# Patient Record
Sex: MALE | Race: WHITE | NOT HISPANIC OR LATINO
[De-identification: names, ages, dates, MRNs, and addresses within clinical notes are randomized per-mention and may not be internally consistent; named-entity substitution may affect disease eponyms.]

---

## 2017-03-16 ENCOUNTER — APPOINTMENT (OUTPATIENT)
Dept: CARDIOLOGY | Facility: CLINIC | Age: 67
End: 2017-03-16

## 2017-04-12 ENCOUNTER — APPOINTMENT (OUTPATIENT)
Dept: UROLOGY | Facility: CLINIC | Age: 67
End: 2017-04-12

## 2017-04-13 LAB
APPEARANCE: CLEAR
BACTERIA: NEGATIVE
BILIRUBIN URINE: NEGATIVE
BLOOD URINE: NEGATIVE
COLOR: YELLOW
GLUCOSE QUALITATIVE U: NORMAL MG/DL
KETONES URINE: NEGATIVE
LEUKOCYTE ESTERASE URINE: NEGATIVE
MICROSCOPIC-UA: NORMAL
NITRITE URINE: NEGATIVE
PH URINE: 5.5
PROTEIN URINE: NEGATIVE MG/DL
PSA FREE FLD-MCNC: 18.9 %
PSA FREE SERPL-MCNC: 0.85 NG/ML
PSA SERPL-MCNC: 4.49 NG/ML
RED BLOOD CELLS URINE: 3 /HPF
SPECIFIC GRAVITY URINE: 1.02
SQUAMOUS EPITHELIAL CELLS: 0 /HPF
UROBILINOGEN URINE: NORMAL MG/DL
WHITE BLOOD CELLS URINE: 0 /HPF

## 2017-04-15 LAB — CORE LAB FLUID CYTOLOGY: NORMAL

## 2017-09-11 ENCOUNTER — NON-APPOINTMENT (OUTPATIENT)
Age: 67
End: 2017-09-11

## 2017-09-11 ENCOUNTER — APPOINTMENT (OUTPATIENT)
Dept: CARDIOLOGY | Facility: CLINIC | Age: 67
End: 2017-09-11
Payer: COMMERCIAL

## 2017-09-11 VITALS
WEIGHT: 211 LBS | HEART RATE: 88 BPM | DIASTOLIC BLOOD PRESSURE: 62 MMHG | HEIGHT: 73 IN | BODY MASS INDEX: 27.96 KG/M2 | SYSTOLIC BLOOD PRESSURE: 114 MMHG

## 2017-09-11 DIAGNOSIS — E11.9 TYPE 2 DIABETES MELLITUS W/OUT COMPLICATIONS: ICD-10-CM

## 2017-09-11 PROCEDURE — 99214 OFFICE O/P EST MOD 30 MIN: CPT

## 2017-09-11 PROCEDURE — 93000 ELECTROCARDIOGRAM COMPLETE: CPT

## 2017-10-11 ENCOUNTER — APPOINTMENT (OUTPATIENT)
Dept: UROLOGY | Facility: CLINIC | Age: 67
End: 2017-10-11
Payer: COMMERCIAL

## 2017-10-11 PROCEDURE — 99214 OFFICE O/P EST MOD 30 MIN: CPT

## 2017-10-12 LAB
APPEARANCE: CLEAR
BACTERIA: NEGATIVE
BILIRUBIN URINE: NEGATIVE
BLOOD URINE: NEGATIVE
COLOR: YELLOW
GLUCOSE QUALITATIVE U: NEGATIVE MG/DL
HYALINE CASTS: 0 /LPF
KETONES URINE: NEGATIVE
LEUKOCYTE ESTERASE URINE: NEGATIVE
MICROSCOPIC-UA: NORMAL
NITRITE URINE: NEGATIVE
PH URINE: 5.5
PROTEIN URINE: NEGATIVE MG/DL
PSA FREE FLD-MCNC: 21.1
PSA FREE SERPL-MCNC: 1.01 NG/ML
PSA SERPL-MCNC: 4.79 NG/ML
RED BLOOD CELLS URINE: 4 /HPF
SPECIFIC GRAVITY URINE: 1.02
SQUAMOUS EPITHELIAL CELLS: 0 /HPF
UROBILINOGEN URINE: NEGATIVE MG/DL
WHITE BLOOD CELLS URINE: 0 /HPF

## 2017-10-16 ENCOUNTER — APPOINTMENT (OUTPATIENT)
Dept: CARDIOLOGY | Facility: CLINIC | Age: 67
End: 2017-10-16
Payer: COMMERCIAL

## 2017-10-16 PROCEDURE — 78452 HT MUSCLE IMAGE SPECT MULT: CPT

## 2017-10-16 PROCEDURE — 93015 CV STRESS TEST SUPVJ I&R: CPT

## 2017-10-16 PROCEDURE — A9500: CPT

## 2017-12-02 ENCOUNTER — RX RENEWAL (OUTPATIENT)
Age: 67
End: 2017-12-02

## 2017-12-24 ENCOUNTER — TRANSCRIPTION ENCOUNTER (OUTPATIENT)
Age: 67
End: 2017-12-24

## 2018-03-12 ENCOUNTER — NON-APPOINTMENT (OUTPATIENT)
Age: 68
End: 2018-03-12

## 2018-03-12 ENCOUNTER — APPOINTMENT (OUTPATIENT)
Dept: CARDIOLOGY | Facility: CLINIC | Age: 68
End: 2018-03-12
Payer: COMMERCIAL

## 2018-03-12 VITALS
DIASTOLIC BLOOD PRESSURE: 66 MMHG | HEART RATE: 84 BPM | BODY MASS INDEX: 28.36 KG/M2 | SYSTOLIC BLOOD PRESSURE: 112 MMHG | WEIGHT: 214 LBS | HEIGHT: 73 IN

## 2018-03-12 PROCEDURE — 99214 OFFICE O/P EST MOD 30 MIN: CPT

## 2018-03-12 PROCEDURE — 93025 MICROVOLT T-WAVE ASSESS: CPT

## 2018-04-19 ENCOUNTER — APPOINTMENT (OUTPATIENT)
Dept: UROLOGY | Facility: CLINIC | Age: 68
End: 2018-04-19
Payer: COMMERCIAL

## 2018-04-19 DIAGNOSIS — R97.20 ELEVATED PROSTATE, SPECIFIC ANTIGEN [PSA]: ICD-10-CM

## 2018-04-19 DIAGNOSIS — Z00.00 ENCOUNTER FOR GENERAL ADULT MEDICAL EXAMINATION W/OUT ABNORMAL FINDINGS: ICD-10-CM

## 2018-04-19 PROCEDURE — 99214 OFFICE O/P EST MOD 30 MIN: CPT

## 2018-04-20 LAB
APPEARANCE: CLEAR
BACTERIA: NEGATIVE
BILIRUBIN URINE: NEGATIVE
BLOOD URINE: NEGATIVE
COLOR: YELLOW
CORE LAB FLUID CYTOLOGY: NORMAL
GLUCOSE QUALITATIVE U: NEGATIVE MG/DL
HYALINE CASTS: 1 /LPF
KETONES URINE: NEGATIVE
LEUKOCYTE ESTERASE URINE: NEGATIVE
MICROSCOPIC-UA: NORMAL
NITRITE URINE: NEGATIVE
PH URINE: 5
PROTEIN URINE: NEGATIVE MG/DL
PSA FREE FLD-MCNC: 19
PSA FREE SERPL-MCNC: 0.78 NG/ML
PSA SERPL-MCNC: 4.11 NG/ML
RED BLOOD CELLS URINE: 2 /HPF
SPECIFIC GRAVITY URINE: 1.02
SQUAMOUS EPITHELIAL CELLS: 0 /HPF
UROBILINOGEN URINE: NEGATIVE MG/DL
WHITE BLOOD CELLS URINE: 1 /HPF

## 2018-07-02 ENCOUNTER — RX RENEWAL (OUTPATIENT)
Age: 68
End: 2018-07-02

## 2018-09-13 ENCOUNTER — APPOINTMENT (OUTPATIENT)
Dept: CARDIOLOGY | Facility: CLINIC | Age: 68
End: 2018-09-13
Payer: COMMERCIAL

## 2018-09-13 ENCOUNTER — NON-APPOINTMENT (OUTPATIENT)
Age: 68
End: 2018-09-13

## 2018-09-13 VITALS
HEIGHT: 73 IN | SYSTOLIC BLOOD PRESSURE: 110 MMHG | DIASTOLIC BLOOD PRESSURE: 69 MMHG | WEIGHT: 218 LBS | OXYGEN SATURATION: 97 % | BODY MASS INDEX: 28.89 KG/M2 | HEART RATE: 99 BPM

## 2018-09-13 PROCEDURE — 93000 ELECTROCARDIOGRAM COMPLETE: CPT

## 2018-09-13 PROCEDURE — 99214 OFFICE O/P EST MOD 30 MIN: CPT

## 2018-10-01 ENCOUNTER — RX RENEWAL (OUTPATIENT)
Age: 68
End: 2018-10-01

## 2018-10-24 ENCOUNTER — APPOINTMENT (OUTPATIENT)
Dept: UROLOGY | Facility: CLINIC | Age: 68
End: 2018-10-24
Payer: COMMERCIAL

## 2018-10-24 PROCEDURE — 99214 OFFICE O/P EST MOD 30 MIN: CPT

## 2018-10-25 LAB
APPEARANCE: CLEAR
BACTERIA: NEGATIVE
BILIRUBIN URINE: NEGATIVE
BLOOD URINE: NEGATIVE
COLOR: YELLOW
GLUCOSE QUALITATIVE U: NEGATIVE MG/DL
HYALINE CASTS: 0 /LPF
KETONES URINE: NEGATIVE
LEUKOCYTE ESTERASE URINE: NEGATIVE
MICROSCOPIC-UA: NORMAL
NITRITE URINE: NEGATIVE
PH URINE: 6
PROTEIN URINE: NEGATIVE MG/DL
PSA FREE FLD-MCNC: 23.5
PSA FREE SERPL-MCNC: 1.34 NG/ML
PSA SERPL-MCNC: 5.71 NG/ML
RED BLOOD CELLS URINE: 3 /HPF
SPECIFIC GRAVITY URINE: 1.03
SQUAMOUS EPITHELIAL CELLS: 0 /HPF
UROBILINOGEN URINE: 1 MG/DL
WHITE BLOOD CELLS URINE: 1 /HPF

## 2018-10-26 LAB — CORE LAB FLUID CYTOLOGY: NORMAL

## 2019-01-09 ENCOUNTER — MEDICATION RENEWAL (OUTPATIENT)
Age: 69
End: 2019-01-09

## 2019-02-06 ENCOUNTER — RX RENEWAL (OUTPATIENT)
Age: 69
End: 2019-02-06

## 2019-03-18 ENCOUNTER — APPOINTMENT (OUTPATIENT)
Dept: CARDIOLOGY | Facility: CLINIC | Age: 69
End: 2019-03-18
Payer: COMMERCIAL

## 2019-03-18 PROCEDURE — 93015 CV STRESS TEST SUPVJ I&R: CPT

## 2019-03-18 PROCEDURE — A9500: CPT

## 2019-03-18 PROCEDURE — 78452 HT MUSCLE IMAGE SPECT MULT: CPT

## 2019-04-03 ENCOUNTER — RX RENEWAL (OUTPATIENT)
Age: 69
End: 2019-04-03

## 2019-04-04 ENCOUNTER — APPOINTMENT (OUTPATIENT)
Dept: UROLOGY | Facility: CLINIC | Age: 69
End: 2019-04-04
Payer: COMMERCIAL

## 2019-04-04 PROCEDURE — 99214 OFFICE O/P EST MOD 30 MIN: CPT

## 2019-04-05 LAB
APPEARANCE: CLEAR
BACTERIA: NEGATIVE
BILIRUBIN URINE: NEGATIVE
BLOOD URINE: NEGATIVE
COLOR: YELLOW
GLUCOSE QUALITATIVE U: NORMAL
HYALINE CASTS: 1 /LPF
KETONES URINE: NEGATIVE
LEUKOCYTE ESTERASE URINE: NEGATIVE
MICROSCOPIC-UA: NORMAL
NITRITE URINE: NEGATIVE
PH URINE: 5.5
PROTEIN URINE: NORMAL
PSA FREE FLD-MCNC: 24 %
PSA FREE SERPL-MCNC: 1.19 NG/ML
PSA SERPL-MCNC: 4.93 NG/ML
RED BLOOD CELLS URINE: 1 /HPF
SPECIFIC GRAVITY URINE: 1.03
SQUAMOUS EPITHELIAL CELLS: 0 /HPF
UROBILINOGEN URINE: NORMAL
WHITE BLOOD CELLS URINE: 1 /HPF

## 2019-06-17 ENCOUNTER — MEDICATION RENEWAL (OUTPATIENT)
Age: 69
End: 2019-06-17

## 2019-08-23 ENCOUNTER — EMERGENCY (EMERGENCY)
Facility: HOSPITAL | Age: 69
LOS: 1 days | Discharge: HOME | End: 2019-08-23
Attending: EMERGENCY MEDICINE
Payer: COMMERCIAL

## 2019-08-23 VITALS
WEIGHT: 214.95 LBS | SYSTOLIC BLOOD PRESSURE: 139 MMHG | TEMPERATURE: 99 F | DIASTOLIC BLOOD PRESSURE: 84 MMHG | HEART RATE: 103 BPM | RESPIRATION RATE: 20 BRPM | HEIGHT: 72 IN

## 2019-08-23 VITALS
HEART RATE: 91 BPM | TEMPERATURE: 99 F | SYSTOLIC BLOOD PRESSURE: 120 MMHG | DIASTOLIC BLOOD PRESSURE: 74 MMHG | OXYGEN SATURATION: 95 % | RESPIRATION RATE: 18 BRPM

## 2019-08-23 DIAGNOSIS — Z98.41 CATARACT EXTRACTION STATUS, RIGHT EYE: Chronic | ICD-10-CM

## 2019-08-23 LAB
ALBUMIN SERPL ELPH-MCNC: 4.8 G/DL — SIGNIFICANT CHANGE UP (ref 3.3–5)
ALP SERPL-CCNC: 65 U/L — SIGNIFICANT CHANGE UP (ref 40–120)
ALT FLD-CCNC: 34 U/L — SIGNIFICANT CHANGE UP (ref 10–45)
ANION GAP SERPL CALC-SCNC: 14 MMOL/L — SIGNIFICANT CHANGE UP (ref 5–17)
APPEARANCE UR: CLEAR — SIGNIFICANT CHANGE UP
AST SERPL-CCNC: 28 U/L — SIGNIFICANT CHANGE UP (ref 10–40)
BACTERIA # UR AUTO: NEGATIVE — SIGNIFICANT CHANGE UP
BASOPHILS # BLD AUTO: 0 K/UL — SIGNIFICANT CHANGE UP (ref 0–0.2)
BASOPHILS NFR BLD AUTO: 0 % — SIGNIFICANT CHANGE UP (ref 0–2)
BILIRUB SERPL-MCNC: 0.6 MG/DL — SIGNIFICANT CHANGE UP (ref 0.2–1.2)
BILIRUB UR-MCNC: NEGATIVE — SIGNIFICANT CHANGE UP
BUN SERPL-MCNC: 28 MG/DL — HIGH (ref 7–23)
CALCIUM SERPL-MCNC: 10 MG/DL — SIGNIFICANT CHANGE UP (ref 8.4–10.5)
CHLORIDE SERPL-SCNC: 100 MMOL/L — SIGNIFICANT CHANGE UP (ref 96–108)
CO2 SERPL-SCNC: 20 MMOL/L — LOW (ref 22–31)
COLOR SPEC: SIGNIFICANT CHANGE UP
CREAT SERPL-MCNC: 1.88 MG/DL — HIGH (ref 0.5–1.3)
DIFF PNL FLD: ABNORMAL
EOSINOPHIL # BLD AUTO: 0 K/UL — SIGNIFICANT CHANGE UP (ref 0–0.5)
EOSINOPHIL NFR BLD AUTO: 0.1 % — SIGNIFICANT CHANGE UP (ref 0–6)
EPI CELLS # UR: 0 /HPF — SIGNIFICANT CHANGE UP
GAS PNL BLDV: SIGNIFICANT CHANGE UP
GLUCOSE SERPL-MCNC: 190 MG/DL — HIGH (ref 70–99)
GLUCOSE UR QL: NEGATIVE — SIGNIFICANT CHANGE UP
HCT VFR BLD CALC: 44.4 % — SIGNIFICANT CHANGE UP (ref 39–50)
HGB BLD-MCNC: 14.7 G/DL — SIGNIFICANT CHANGE UP (ref 13–17)
HYALINE CASTS # UR AUTO: 1 /LPF — SIGNIFICANT CHANGE UP (ref 0–2)
KETONES UR-MCNC: ABNORMAL
LEUKOCYTE ESTERASE UR-ACNC: NEGATIVE — SIGNIFICANT CHANGE UP
LYMPHOCYTES # BLD AUTO: 1 K/UL — SIGNIFICANT CHANGE UP (ref 1–3.3)
LYMPHOCYTES # BLD AUTO: 7.6 % — LOW (ref 13–44)
MCHC RBC-ENTMCNC: 29.1 PG — SIGNIFICANT CHANGE UP (ref 27–34)
MCHC RBC-ENTMCNC: 33.1 GM/DL — SIGNIFICANT CHANGE UP (ref 32–36)
MCV RBC AUTO: 87.8 FL — SIGNIFICANT CHANGE UP (ref 80–100)
MONOCYTES # BLD AUTO: 0.5 K/UL — SIGNIFICANT CHANGE UP (ref 0–0.9)
MONOCYTES NFR BLD AUTO: 4.3 % — SIGNIFICANT CHANGE UP (ref 2–14)
NEUTROPHILS # BLD AUTO: 11.1 K/UL — HIGH (ref 1.8–7.4)
NEUTROPHILS NFR BLD AUTO: 88 % — HIGH (ref 43–77)
NITRITE UR-MCNC: NEGATIVE — SIGNIFICANT CHANGE UP
PH UR: 5.5 — SIGNIFICANT CHANGE UP (ref 5–8)
PLATELET # BLD AUTO: 206 K/UL — SIGNIFICANT CHANGE UP (ref 150–400)
POTASSIUM SERPL-MCNC: 4.4 MMOL/L — SIGNIFICANT CHANGE UP (ref 3.5–5.3)
POTASSIUM SERPL-SCNC: 4.4 MMOL/L — SIGNIFICANT CHANGE UP (ref 3.5–5.3)
PROT SERPL-MCNC: 7.8 G/DL — SIGNIFICANT CHANGE UP (ref 6–8.3)
PROT UR-MCNC: ABNORMAL
RBC # BLD: 5.06 M/UL — SIGNIFICANT CHANGE UP (ref 4.2–5.8)
RBC # FLD: 12.3 % — SIGNIFICANT CHANGE UP (ref 10.3–14.5)
RBC CASTS # UR COMP ASSIST: 62 /HPF — HIGH (ref 0–4)
SODIUM SERPL-SCNC: 134 MMOL/L — LOW (ref 135–145)
SP GR SPEC: 1.02 — SIGNIFICANT CHANGE UP (ref 1.01–1.02)
UROBILINOGEN FLD QL: NEGATIVE — SIGNIFICANT CHANGE UP
WBC # BLD: 12.6 K/UL — HIGH (ref 3.8–10.5)
WBC # FLD AUTO: 12.6 K/UL — HIGH (ref 3.8–10.5)
WBC UR QL: 0 /HPF — SIGNIFICANT CHANGE UP (ref 0–5)

## 2019-08-23 PROCEDURE — 85027 COMPLETE CBC AUTOMATED: CPT

## 2019-08-23 PROCEDURE — 87086 URINE CULTURE/COLONY COUNT: CPT

## 2019-08-23 PROCEDURE — 81001 URINALYSIS AUTO W/SCOPE: CPT

## 2019-08-23 PROCEDURE — 84132 ASSAY OF SERUM POTASSIUM: CPT

## 2019-08-23 PROCEDURE — 74176 CT ABD & PELVIS W/O CONTRAST: CPT | Mod: 26

## 2019-08-23 PROCEDURE — 82803 BLOOD GASES ANY COMBINATION: CPT

## 2019-08-23 PROCEDURE — 99284 EMERGENCY DEPT VISIT MOD MDM: CPT | Mod: 25

## 2019-08-23 PROCEDURE — 74176 CT ABD & PELVIS W/O CONTRAST: CPT

## 2019-08-23 PROCEDURE — 84295 ASSAY OF SERUM SODIUM: CPT

## 2019-08-23 PROCEDURE — 82435 ASSAY OF BLOOD CHLORIDE: CPT

## 2019-08-23 PROCEDURE — 85014 HEMATOCRIT: CPT

## 2019-08-23 PROCEDURE — 82330 ASSAY OF CALCIUM: CPT

## 2019-08-23 PROCEDURE — 80053 COMPREHEN METABOLIC PANEL: CPT

## 2019-08-23 PROCEDURE — 83605 ASSAY OF LACTIC ACID: CPT

## 2019-08-23 PROCEDURE — 99284 EMERGENCY DEPT VISIT MOD MDM: CPT

## 2019-08-23 PROCEDURE — 82947 ASSAY GLUCOSE BLOOD QUANT: CPT

## 2019-08-23 RX ORDER — SODIUM CHLORIDE 9 MG/ML
1000 INJECTION INTRAMUSCULAR; INTRAVENOUS; SUBCUTANEOUS ONCE
Refills: 0 | Status: COMPLETED | OUTPATIENT
Start: 2019-08-23 | End: 2019-08-23

## 2019-08-23 RX ADMIN — SODIUM CHLORIDE 1000 MILLILITER(S): 9 INJECTION INTRAMUSCULAR; INTRAVENOUS; SUBCUTANEOUS at 05:14

## 2019-08-23 NOTE — ED ADULT NURSE NOTE - OBJECTIVE STATEMENT
70 y/o male PMH CAD, elevated PSA, HLD, type II diabetes, renal insufficiency arrives to ED with c/o left lower back pain. Patient reports pain started yesterday, again today. Patient stating " I thought I had a kidney stone". Patient described pain as "aching", non radiating. Patient is a/ox4, well appearing. Denies pain upon RN assessment. Patient denies fever/chills, n/v/d. No CVA tenderness noted. Denies urinary symptoms of burning/frequency. Patient denies chest pain, shortness of breath, palpitation. Wife at bedside. No acute distress.

## 2019-08-23 NOTE — ED PROVIDER NOTE - OBJECTIVE STATEMENT
68yo M hx CAD, dm p/w 1 day of llq pain that migrated to L flank colicky in nature now currently resolving. Assc with nausea and 1 episode of vomiting. Denies fever, chills, cp, sob, urinary sx, diarrhea. No hx of kidney stones.

## 2019-08-23 NOTE — ED PROVIDER NOTE - PHYSICAL EXAMINATION
Gen: Well appearing, NAD  Head: NCAT  HEENT: PERRL, MMM, normal conjunctiva, anicteric, neck supple  Lung: CTAB, no adventitious sounds  CV: RRR, no murmurs  Abd: soft, NTND, no rebound or guarding, no CVAT  MSK: No edema, no visible deformities  Neuro: Moving all extremity grossly  Skin: Warm and dry, no evidence of rash  Psych: normal mood and affect

## 2019-08-23 NOTE — ED PROVIDER NOTE - PROGRESS NOTE DETAILS
Adi Moore DO PGY2 - continues to be pain free. Cr is baseline per pt. Will f/u with his urologist this week

## 2019-08-23 NOTE — ED PROVIDER NOTE - ATTENDING CONTRIBUTION TO CARE
pt is a 70 y/o male with l flank pain started earlier today with no position of comfort with n/v earlier, no uarinary complaints, abd soft, nt, renal colic work up ordered.

## 2019-08-23 NOTE — ED ADULT NURSE NOTE - CHPI ED NUR SYMPTOMS NEG
no fever/no vomiting/no abdominal distension/no burning urination/no chills/no hematuria/no nausea/no blood in stool

## 2019-08-23 NOTE — ED PROVIDER NOTE - NSFOLLOWUPINSTRUCTIONS_ED_ALL_ED_FT
- Lab and imaging results, if performed, were discussed with you along with your discharge diagnosis    - Follow up with your doctor in 1 week - bring copies of your results if you were given. If you do not have a primary doctor, please call [phone] to find one convenient for you    - Return to the ED for any new, worsening, or concerning symptoms to you    - Continue all prescribed medications    - Take ibuprofen/tylenol as directed as needed for pain    - Rest and keep yourself hydrated with fluids    - Follow up with your urologist this week

## 2019-08-23 NOTE — ED ADULT NURSE NOTE - PMH
CAD (coronary artery disease)    Elevated PSA    Hyperlipidemia    Renal insufficiency    Type 2 diabetes mellitus

## 2019-08-24 LAB
CULTURE RESULTS: NO GROWTH — SIGNIFICANT CHANGE UP
SPECIMEN SOURCE: SIGNIFICANT CHANGE UP

## 2019-09-05 ENCOUNTER — APPOINTMENT (OUTPATIENT)
Dept: UROLOGY | Facility: CLINIC | Age: 69
End: 2019-09-05
Payer: COMMERCIAL

## 2019-09-05 DIAGNOSIS — R97.20 ELEVATED PROSTATE, SPECIFIC ANTIGEN [PSA]: ICD-10-CM

## 2019-09-05 LAB
APPEARANCE: CLEAR
BACTERIA: NEGATIVE
BILIRUBIN URINE: NEGATIVE
BLOOD URINE: NEGATIVE
COLOR: YELLOW
GLUCOSE QUALITATIVE U: NEGATIVE
HYALINE CASTS: 0 /LPF
KETONES URINE: NEGATIVE
LEUKOCYTE ESTERASE URINE: NEGATIVE
MICROSCOPIC-UA: NORMAL
NITRITE URINE: NEGATIVE
PH URINE: 5.5
PROTEIN URINE: NEGATIVE
RED BLOOD CELLS URINE: 1 /HPF
SPECIFIC GRAVITY URINE: 1.02
SQUAMOUS EPITHELIAL CELLS: 1 /HPF
UROBILINOGEN URINE: NORMAL
WHITE BLOOD CELLS URINE: 1 /HPF

## 2019-09-05 PROCEDURE — 99214 OFFICE O/P EST MOD 30 MIN: CPT

## 2019-09-06 LAB
PSA FREE FLD-MCNC: 21 %
PSA FREE SERPL-MCNC: 1.15 NG/ML
PSA SERPL-MCNC: 5.61 NG/ML

## 2019-09-12 ENCOUNTER — NON-APPOINTMENT (OUTPATIENT)
Age: 69
End: 2019-09-12

## 2019-09-12 ENCOUNTER — APPOINTMENT (OUTPATIENT)
Dept: CARDIOLOGY | Facility: CLINIC | Age: 69
End: 2019-09-12
Payer: COMMERCIAL

## 2019-09-12 VITALS — SYSTOLIC BLOOD PRESSURE: 104 MMHG | DIASTOLIC BLOOD PRESSURE: 60 MMHG

## 2019-09-12 VITALS
WEIGHT: 211 LBS | OXYGEN SATURATION: 95 % | DIASTOLIC BLOOD PRESSURE: 67 MMHG | HEART RATE: 94 BPM | BODY MASS INDEX: 27.96 KG/M2 | SYSTOLIC BLOOD PRESSURE: 99 MMHG | HEIGHT: 73 IN

## 2019-09-12 PROCEDURE — 93000 ELECTROCARDIOGRAM COMPLETE: CPT

## 2019-09-12 PROCEDURE — 99214 OFFICE O/P EST MOD 30 MIN: CPT

## 2019-09-12 NOTE — REASON FOR VISIT
[Coronary Artery Disease] : coronary artery disease [Hyperlipidemia] : hyperlipidemia [FreeTextEntry1] : \par \par \par

## 2019-09-12 NOTE — HISTORY OF PRESENT ILLNESS
[FreeTextEntry1] : He presents in scheduled followup reporting that he continues to feel well. Remains active in the gym daily, including 30 minutes of treadmill exercise to Pulse of 122 bpm without any effort provoked symptoms.No c/o chest, throat,jaw, arm or upper back discomfort.  No dyspnea, orthopnea or PND.  No palpitations, dizziness or syncope.  No edema or claudication.\par Labs with Dr. Tolentino on July 15 demonstrated creatinine 1.60 which was attributed to dehydration and improved upon repeat.  His only other interval problem was the passing of a small kidney stone.

## 2020-02-26 ENCOUNTER — RX RENEWAL (OUTPATIENT)
Age: 70
End: 2020-02-26

## 2020-03-03 ENCOUNTER — APPOINTMENT (OUTPATIENT)
Dept: CARDIOLOGY | Facility: CLINIC | Age: 70
End: 2020-03-03
Payer: COMMERCIAL

## 2020-03-03 PROCEDURE — 93015 CV STRESS TEST SUPVJ I&R: CPT

## 2020-03-03 PROCEDURE — 78452 HT MUSCLE IMAGE SPECT MULT: CPT

## 2020-03-03 PROCEDURE — A9500: CPT

## 2020-03-12 ENCOUNTER — APPOINTMENT (OUTPATIENT)
Dept: UROLOGY | Facility: CLINIC | Age: 70
End: 2020-03-12

## 2020-04-02 ENCOUNTER — APPOINTMENT (OUTPATIENT)
Dept: UROLOGY | Facility: CLINIC | Age: 70
End: 2020-04-02
Payer: COMMERCIAL

## 2020-04-02 DIAGNOSIS — N20.0 CALCULUS OF KIDNEY: ICD-10-CM

## 2020-04-02 PROCEDURE — 99214 OFFICE O/P EST MOD 30 MIN: CPT

## 2020-04-03 LAB
APPEARANCE: CLEAR
BACTERIA: NEGATIVE
BILIRUBIN URINE: NEGATIVE
BLOOD URINE: NEGATIVE
COLOR: YELLOW
GLUCOSE QUALITATIVE U: NORMAL
HYALINE CASTS: 1 /LPF
KETONES URINE: NEGATIVE
LEUKOCYTE ESTERASE URINE: NEGATIVE
MICROSCOPIC-UA: NORMAL
NITRITE URINE: NEGATIVE
PH URINE: 5.5
PROTEIN URINE: NORMAL
PSA FREE FLD-MCNC: 22 %
PSA FREE SERPL-MCNC: 1.04 NG/ML
PSA SERPL-MCNC: 4.66 NG/ML
RED BLOOD CELLS URINE: 1 /HPF
SPECIFIC GRAVITY URINE: 1.03
SQUAMOUS EPITHELIAL CELLS: 0 /HPF
UROBILINOGEN URINE: NORMAL
WHITE BLOOD CELLS URINE: 1 /HPF

## 2020-04-07 ENCOUNTER — TRANSCRIPTION ENCOUNTER (OUTPATIENT)
Age: 70
End: 2020-04-07

## 2020-04-29 ENCOUNTER — APPOINTMENT (OUTPATIENT)
Dept: UROLOGY | Facility: CLINIC | Age: 70
End: 2020-04-29

## 2020-06-29 ENCOUNTER — RX RENEWAL (OUTPATIENT)
Age: 70
End: 2020-06-29

## 2020-09-10 ENCOUNTER — APPOINTMENT (OUTPATIENT)
Dept: CARDIOLOGY | Facility: CLINIC | Age: 70
End: 2020-09-10
Payer: COMMERCIAL

## 2020-09-10 ENCOUNTER — NON-APPOINTMENT (OUTPATIENT)
Age: 70
End: 2020-09-10

## 2020-09-10 VITALS
SYSTOLIC BLOOD PRESSURE: 116 MMHG | BODY MASS INDEX: 27.96 KG/M2 | TEMPERATURE: 97.5 F | OXYGEN SATURATION: 95 % | WEIGHT: 211 LBS | HEIGHT: 73 IN | HEART RATE: 103 BPM | DIASTOLIC BLOOD PRESSURE: 73 MMHG

## 2020-09-10 DIAGNOSIS — E66.9 OBESITY, UNSPECIFIED: ICD-10-CM

## 2020-09-10 PROCEDURE — 99214 OFFICE O/P EST MOD 30 MIN: CPT

## 2020-09-10 PROCEDURE — 93000 ELECTROCARDIOGRAM COMPLETE: CPT

## 2020-09-10 NOTE — REASON FOR VISIT
[Hyperlipidemia] : hyperlipidemia [Coronary Artery Disease] : coronary artery disease [FreeTextEntry1] : \par \par \par

## 2020-09-10 NOTE — HISTORY OF PRESENT ILLNESS
[FreeTextEntry1] : He presents in scheduled followup reporting that he continues to feel well. Remains active including 30 minutes of treadmill exercise without any effort provoked symptoms.No c/o chest, throat,jaw, arm or upper back discomfort.  No dyspnea, orthopnea or PND.  No palpitations, dizziness or syncope.  No edema or claudication.\par

## 2020-09-28 ENCOUNTER — EMERGENCY (EMERGENCY)
Facility: HOSPITAL | Age: 70
LOS: 1 days | Discharge: HOME | End: 2020-09-28
Attending: EMERGENCY MEDICINE
Payer: COMMERCIAL

## 2020-09-28 VITALS
DIASTOLIC BLOOD PRESSURE: 78 MMHG | WEIGHT: 207.9 LBS | SYSTOLIC BLOOD PRESSURE: 131 MMHG | OXYGEN SATURATION: 100 % | TEMPERATURE: 98 F | RESPIRATION RATE: 16 BRPM | HEART RATE: 97 BPM | HEIGHT: 72 IN

## 2020-09-28 VITALS
SYSTOLIC BLOOD PRESSURE: 122 MMHG | DIASTOLIC BLOOD PRESSURE: 67 MMHG | RESPIRATION RATE: 16 BRPM | TEMPERATURE: 98 F | HEART RATE: 91 BPM | OXYGEN SATURATION: 99 %

## 2020-09-28 DIAGNOSIS — Z98.41 CATARACT EXTRACTION STATUS, RIGHT EYE: Chronic | ICD-10-CM

## 2020-09-28 PROCEDURE — 93971 EXTREMITY STUDY: CPT | Mod: 26

## 2020-09-28 PROCEDURE — 99284 EMERGENCY DEPT VISIT MOD MDM: CPT | Mod: 25

## 2020-09-28 PROCEDURE — 93971 EXTREMITY STUDY: CPT

## 2020-09-28 PROCEDURE — 99284 EMERGENCY DEPT VISIT MOD MDM: CPT

## 2020-09-28 RX ORDER — HYDROCORTISONE 1 %
1 OINTMENT (GRAM) TOPICAL ONCE
Refills: 0 | Status: COMPLETED | OUTPATIENT
Start: 2020-09-28 | End: 2020-09-29

## 2020-09-28 NOTE — ED PROVIDER NOTE - CLINICAL SUMMARY MEDICAL DECISION MAKING FREE TEXT BOX
71 y/o M w/ two issues 1) L axillary redness/warmth likely contact dermatitis, low suspicion for cellulitis. Well-demarcated, in same area as deodorant use 2) minimal LLe redness/warmth around shin area. Will obtain DVT study. VSS, no systemic signs of infection. Hydrocortisone cream for L axillary, if us negative likely d/c with pcp f/u.

## 2020-09-28 NOTE — ED PROVIDER NOTE - PHYSICAL EXAMINATION
[Const] well-appearing, resting comfortably, no acute distress  [HEENT] PERRL, EOMI, moist mucus membranes  [Neck] Supple, trachea midline  [CV] +S1/S2, no m/r/g appreciated  [Lungs] Clear to auscultations bilaterally, no adventitious lung sounds  [Abd] soft, non-tender, nondistended in all 4 quadrants  [MSK] 5/5 upper extremity and lower extremity str bilaterally  [Skin] left axillary erythema/warmth, minimal TTP, well-demarcated, minimal redness/swelling to shin  [Neuro] A&Ox3, Cranial Nerves II-XII intact

## 2020-09-28 NOTE — ED PROVIDER NOTE - NSFOLLOWUPINSTRUCTIONS_ED_ALL_ED_FT
You may use warm compresses, ibuprofen and tylenol as needed for the leg pain. The rash under your left arm is likely an allergic reaction, you may use the cream given to you once a day. Follow-up with your primary care doctor within the next 1-3 days.     You were seen in the Emergency Department for leg pain (superficial thrombophlebitis).   1) Advance activity as tolerated.    2) Continue all previously prescribed medications as directed.    3) Follow up with your primary care physician in 24-48 hours - take copies of your results.    4) Return to the Emergency Department for worsening or persistent symptoms, and/or ANY NEW OR CONCERNING SYMPTOMS including but not limited to severely worsening pain, inability to walk, chest pain/shortness of breath.

## 2020-09-28 NOTE — ED ADULT TRIAGE NOTE - CHIEF COMPLAINT QUOTE
Soreness/tenderness/redness to left leg. Redness and tenderness to left arm under armpit approximately 1 hour. Patient concerned for blood clot. Denies SOB. Denies blood thinners.

## 2020-09-28 NOTE — ED PROVIDER NOTE - PROGRESS NOTE DETAILS
Yelena, PGY-2: Pt reassessed multiple times in the past several hours. Patient doing well, no complaints at this time, VSS, pex benign, US reveals superficial thrombophlebitis. Yelena, PGY-2: Patient did not want to wait, left hospital. Verbal instructions given to patient regarding superficial thrombophlebitis over phone, DVT study negative, pt will f/u with PCP, return precautions over phone given.

## 2020-09-28 NOTE — ED ADULT NURSE NOTE - NSIMPLEMENTINTERV_GEN_ALL_ED
Implemented All Universal Safety Interventions:  Juliette to call system. Call bell, personal items and telephone within reach. Instruct patient to call for assistance. Room bathroom lighting operational. Non-slip footwear when patient is off stretcher. Physically safe environment: no spills, clutter or unnecessary equipment. Stretcher in lowest position, wheels locked, appropriate side rails in place.

## 2020-09-28 NOTE — ED PROVIDER NOTE - OBJECTIVE STATEMENT
71 y/o M w/ hx CAD, DM, HLD presents with left axillary rash and left shin redness/warmth. Patient states left axillary rash started today after using his deodorant, mildly painful, bright red/warm. States left shin redness has been going on for the past few days. Denies chest pain, shortness of breath, recent surgery, testosterone/hormones, not-bed bound, no recent travel or sick contacts, illness. No hx of blood clots, denies orthopnea/cough. Denies fevers/chills, nausea/vomiting. 71 y/o M w/ hx CAD, DM, HLD presents with left axillary rash and left shin redness/warmth. Patient states left axillary rash started today after using his deodorant, mildly painful, bright red/warm. States left shin redness has been going on for the past few days. Denies chest pain, shortness of breath, recent surgery, testosterone/hormones, not-bed bound, no recent travel or sick contacts, illness. No hx of blood clots, denies orthopnea/cough. Denies fevers/chills, nausea/vomiting.    Attendinyo male presents with left axillary rash which is itchy and slightly painful.  also with swelling to the left anterior leg.  no shortness of breath.

## 2020-09-29 RX ADMIN — Medication 1 APPLICATION(S): at 00:11

## 2020-10-07 ENCOUNTER — APPOINTMENT (OUTPATIENT)
Dept: UROLOGY | Facility: CLINIC | Age: 70
End: 2020-10-07
Payer: COMMERCIAL

## 2020-10-07 VITALS — TEMPERATURE: 96.7 F

## 2020-10-07 PROCEDURE — 99214 OFFICE O/P EST MOD 30 MIN: CPT

## 2020-10-08 LAB
APPEARANCE: CLEAR
BACTERIA: NEGATIVE
BILIRUBIN URINE: NEGATIVE
BLOOD URINE: NEGATIVE
COLOR: NORMAL
GLUCOSE QUALITATIVE U: NEGATIVE
HYALINE CASTS: 0 /LPF
KETONES URINE: NEGATIVE
LEUKOCYTE ESTERASE URINE: NEGATIVE
MICROSCOPIC-UA: NORMAL
NITRITE URINE: NEGATIVE
PH URINE: 5.5
PROTEIN URINE: NEGATIVE
PSA FREE FLD-MCNC: 20 %
PSA FREE SERPL-MCNC: 1.14 NG/ML
PSA SERPL-MCNC: 5.62 NG/ML
RED BLOOD CELLS URINE: 0 /HPF
SPECIFIC GRAVITY URINE: 1.02
SQUAMOUS EPITHELIAL CELLS: 0 /HPF
UROBILINOGEN URINE: NORMAL
WHITE BLOOD CELLS URINE: 0 /HPF

## 2020-11-24 ENCOUNTER — TRANSCRIPTION ENCOUNTER (OUTPATIENT)
Age: 70
End: 2020-11-24

## 2020-12-18 ENCOUNTER — NON-APPOINTMENT (OUTPATIENT)
Age: 70
End: 2020-12-18

## 2021-01-01 NOTE — ED ADULT NURSE NOTE - OBJECTIVE STATEMENT
Patient is a 70 year old male complaining of L leg and L axilla redness x1 day. Arrived by walk-in from home. Patient is A&O x 4 and appears well. Pt reports he noted redness on his interior left calf yesterday that concerned him for blood clot, and now notes a new redness under L axilla. Denies complaints of (chest pain, sob, fevers, chills, n/v/d, headache, syncope, burning urination, blood in urine, blood in stool, rashes, h/o prior similar episodes. L leg id not swollen, temperature is consistent with the rest of the skin. No open wounds. VSS/ NAD. [Alert] : alert [Acute Distress] : no acute distress [Normocephalic] : normocephalic [Flat Open Anterior Broussard] : flat open anterior fontanelle [Icteric sclera] : nonicteric sclera [PERRL] : PERRL [Red Reflex Bilateral] : red reflex bilateral [Normally Placed Ears] : normally placed ears [Auricles Well Formed] : auricles well formed [Clear Tympanic membranes] : clear tympanic membranes [Light reflex present] : light reflex present [Bony structures visible] : bony structures visible [Patent Auditory Canal] : patent auditory canal [Discharge] : no discharge [Nares Patent] : nares patent [Palate Intact] : palate intact [Uvula Midline] : uvula midline [Supple, full passive range of motion] : supple, full passive range of motion [Palpable Masses] : no palpable masses [Symmetric Chest Rise] : symmetric chest rise [Clear to Auscultation Bilaterally] : clear to auscultation bilaterally [Regular Rate and Rhythm] : regular rate and rhythm [S1, S2 present] : S1, S2 present [Murmurs] : no murmurs [+2 Femoral Pulses] : +2 femoral pulses [Soft] : soft [Tender] : nontender [Distended] : not distended [Bowel Sounds] : bowel sounds present [Umbilical Stump Dry, Clean, Intact] : umbilical stump dry, clean, intact [Hepatomegaly] : no hepatomegaly [Splenomegaly] : no splenomegaly [Normal external genitailia] : normal external genitalia [Central Urethral Opening] : central urethral opening [Patent] : patent [Testicles Descended Bilaterally] : testicles descended bilaterally [Normally Placed] : normally placed [No Abnormal Lymph Nodes Palpated] : no abnormal lymph nodes palpated [Jordan-Ortolani] : negative Jordan-Ortolani [Symmetric Flexed Extremities] : symmetric flexed extremities [Spinal Dimple] : no spinal dimple [Tuft of Hair] : no tuft of hair [Startle Reflex] : startle reflex present [Suck Reflex] : suck reflex present [Rooting] : rooting reflex present [Palmar Grasp] : palmar grasp present [Plantar Grasp] : plantar reflex present [Symmetric Regla] : symmetric Mikado [Jaundice] : not jaundice

## 2021-01-03 ENCOUNTER — TRANSCRIPTION ENCOUNTER (OUTPATIENT)
Age: 71
End: 2021-01-03

## 2021-02-14 ENCOUNTER — RX RENEWAL (OUTPATIENT)
Age: 71
End: 2021-02-14

## 2021-03-09 ENCOUNTER — APPOINTMENT (OUTPATIENT)
Dept: CARDIOLOGY | Facility: CLINIC | Age: 71
End: 2021-03-09
Payer: COMMERCIAL

## 2021-03-09 PROCEDURE — A9500: CPT

## 2021-03-09 PROCEDURE — 99072 ADDL SUPL MATRL&STAF TM PHE: CPT

## 2021-03-09 PROCEDURE — 78452 HT MUSCLE IMAGE SPECT MULT: CPT

## 2021-03-09 PROCEDURE — 93015 CV STRESS TEST SUPVJ I&R: CPT

## 2021-03-30 ENCOUNTER — RX RENEWAL (OUTPATIENT)
Age: 71
End: 2021-03-30

## 2021-04-07 ENCOUNTER — APPOINTMENT (OUTPATIENT)
Dept: UROLOGY | Facility: CLINIC | Age: 71
End: 2021-04-07
Payer: COMMERCIAL

## 2021-04-07 PROCEDURE — 99214 OFFICE O/P EST MOD 30 MIN: CPT

## 2021-04-07 PROCEDURE — 99072 ADDL SUPL MATRL&STAF TM PHE: CPT

## 2021-04-08 LAB
APPEARANCE: CLEAR
BACTERIA: NEGATIVE
BILIRUBIN URINE: NEGATIVE
BLOOD URINE: NEGATIVE
COLOR: YELLOW
GLUCOSE QUALITATIVE U: NEGATIVE
HYALINE CASTS: 0 /LPF
KETONES URINE: NEGATIVE
LEUKOCYTE ESTERASE URINE: NEGATIVE
MICROSCOPIC-UA: NORMAL
NITRITE URINE: NEGATIVE
PH URINE: 5.5
PROTEIN URINE: NORMAL
PSA FREE FLD-MCNC: 23 %
PSA FREE SERPL-MCNC: 1.36 NG/ML
PSA SERPL-MCNC: 5.94 NG/ML
RED BLOOD CELLS URINE: 6 /HPF
SPECIFIC GRAVITY URINE: 1.03
SQUAMOUS EPITHELIAL CELLS: 0 /HPF
UROBILINOGEN URINE: NORMAL
WHITE BLOOD CELLS URINE: 1 /HPF

## 2021-06-15 ENCOUNTER — APPOINTMENT (OUTPATIENT)
Dept: CARDIOLOGY | Facility: CLINIC | Age: 71
End: 2021-06-15
Payer: COMMERCIAL

## 2021-06-15 ENCOUNTER — NON-APPOINTMENT (OUTPATIENT)
Age: 71
End: 2021-06-15

## 2021-06-15 VITALS
SYSTOLIC BLOOD PRESSURE: 115 MMHG | WEIGHT: 207 LBS | BODY MASS INDEX: 27.43 KG/M2 | OXYGEN SATURATION: 97 % | DIASTOLIC BLOOD PRESSURE: 76 MMHG | HEIGHT: 73 IN | HEART RATE: 89 BPM

## 2021-06-15 PROCEDURE — 99072 ADDL SUPL MATRL&STAF TM PHE: CPT

## 2021-06-15 PROCEDURE — 93000 ELECTROCARDIOGRAM COMPLETE: CPT

## 2021-06-15 PROCEDURE — 99214 OFFICE O/P EST MOD 30 MIN: CPT

## 2021-06-15 NOTE — HISTORY OF PRESENT ILLNESS
[FreeTextEntry1] : He presents in scheduled followup reporting that he continues to feel well. Remains active, including 30 minutes of treadmill exercise without any effort provoked symptoms.No c/o chest, throat,jaw, arm or upper back discomfort.  No dyspnea, orthopnea or PND.  No palpitations, dizziness or syncope.  No edema or claudication.\par \par \par Time somnolence and some snoring.  He does awaken 2-3 times nightly for nocturia (Dr. Goins aware).  At his wife's goading, plan is to present for a sleep study.\par \par  with diabetic diet.  Last A1c 7.3%.

## 2021-06-15 NOTE — REASON FOR VISIT
[Coronary Artery Disease] : coronary artery disease [Hyperlipidemia] : hyperlipidemia [FreeTextEntry1] : sinus tachycardia

## 2021-06-15 NOTE — CARDIOLOGY SUMMARY
[___] : [unfilled] [de-identified] : 3/9/2021: 10 minutes of the Rex protocol even repeat heart rate 158 beats minute with a normal blood pressure response.  No ST segment abnormalities.  Rare PVCs.  EF > 70%.  Mixed fixed defects in basal to mid inferior walls is corrected with prone imaging consistent with attenuation artifact.

## 2021-11-04 ENCOUNTER — APPOINTMENT (OUTPATIENT)
Dept: UROLOGY | Facility: CLINIC | Age: 71
End: 2021-11-04
Payer: COMMERCIAL

## 2021-11-04 PROCEDURE — 99214 OFFICE O/P EST MOD 30 MIN: CPT

## 2021-11-05 LAB
APPEARANCE: CLEAR
BACTERIA: NEGATIVE
BILIRUBIN URINE: NEGATIVE
BLOOD URINE: NEGATIVE
COLOR: NORMAL
GLUCOSE QUALITATIVE U: NEGATIVE
HYALINE CASTS: 0 /LPF
KETONES URINE: NEGATIVE
LEUKOCYTE ESTERASE URINE: NEGATIVE
MICROSCOPIC-UA: NORMAL
NITRITE URINE: NEGATIVE
PH URINE: 6.5
PROTEIN URINE: NEGATIVE
PSA FREE FLD-MCNC: 17 %
PSA FREE SERPL-MCNC: 1.18 NG/ML
PSA SERPL-MCNC: 6.77 NG/ML
RED BLOOD CELLS URINE: 1 /HPF
SPECIFIC GRAVITY URINE: 1.02
SQUAMOUS EPITHELIAL CELLS: 0 /HPF
UROBILINOGEN URINE: NORMAL
WHITE BLOOD CELLS URINE: 1 /HPF

## 2021-11-28 ENCOUNTER — RX RENEWAL (OUTPATIENT)
Age: 71
End: 2021-11-28

## 2021-12-14 ENCOUNTER — APPOINTMENT (OUTPATIENT)
Dept: CARDIOLOGY | Facility: CLINIC | Age: 71
End: 2021-12-14
Payer: COMMERCIAL

## 2021-12-14 ENCOUNTER — NON-APPOINTMENT (OUTPATIENT)
Age: 71
End: 2021-12-14

## 2021-12-14 VITALS
DIASTOLIC BLOOD PRESSURE: 68 MMHG | WEIGHT: 198 LBS | OXYGEN SATURATION: 97 % | HEART RATE: 90 BPM | SYSTOLIC BLOOD PRESSURE: 110 MMHG | HEIGHT: 73 IN | BODY MASS INDEX: 26.24 KG/M2

## 2021-12-14 PROCEDURE — 93000 ELECTROCARDIOGRAM COMPLETE: CPT

## 2021-12-14 PROCEDURE — 99214 OFFICE O/P EST MOD 30 MIN: CPT

## 2021-12-14 NOTE — HISTORY OF PRESENT ILLNESS
[FreeTextEntry1] : He presents in scheduled followup reporting that he continues to feel well. Remains active, including 30 minutes of treadmill exercise without any effort provoked symptoms.No c/o chest, throat,jaw, arm or upper back discomfort.  No dyspnea, orthopnea or PND.  No palpitations, dizziness or syncope.  No edema or claudication.\par \par \par Despite weight loss, unchanged daytime somnolence and some snoring.  He does awaken 1-2 times nightly for nocturia. \par \par Compliant with diabetic diet.  Last A1c 7.0%.

## 2021-12-14 NOTE — CARDIOLOGY SUMMARY
[___] : [unfilled] [de-identified] : 3/9/2021: 10 minutes of the Rex protocol even repeat heart rate 158 beats minute with a normal blood pressure response.  No ST segment abnormalities.  Rare PVCs.  EF > 70%.  Mixed fixed defects in basal to mid inferior walls is corrected with prone imaging consistent with attenuation artifact.

## 2022-01-29 ENCOUNTER — RX RENEWAL (OUTPATIENT)
Age: 72
End: 2022-01-29

## 2022-03-28 ENCOUNTER — RX RENEWAL (OUTPATIENT)
Age: 72
End: 2022-03-28

## 2022-04-15 NOTE — ED ADULT NURSE NOTE - CAS TRG GENERAL NORM CIRC DET
Diagnosis:   1. Malignant neoplasm of head of pancreas (CMS/HCC)       Regimen: gemzar/abraxane  Cycle/Day: cycle 2 day 1  Is this a C1D1 appt?  No       is supervising clinician today.    Vital Signs:   Vitals:    04/15/22 1354   BP: 136/71   Pulse: (!) 105   Resp: 16   Temp: 98 °F (36.7 °C)   Weight: 58.7 kg (129 lb 4.8 oz)   Height: 5' 4\" (1.626 m)   PainSc: 6    PainLoc: Generalized        Allergies:    ALLERGIES:   Allergen Reactions   • Ace Inhibitors PRURITUS     unsure   • Ampicillin RASH     BLISTERS   • Avocado   (Food Or Med) Other (See Comments)     STOMACH CRAMPS   • Azithromycin Other (See Comments)     Facial swelling   • Metformin Other (See Comments)     Can't recall  VOMINTING   • Penicillins RASH     Blisters   • Sulfa Antibiotics RASH     BLISTERS   • Adhesive   (Environmental) RASH and ERYTHEMA     NYLON TAPE        Medications:  The medication list was reviewed. No changes noted.     ECOG:   ECOG [04/15/22 1400]   ECOG Performance Status 1       Distress Screening: Is this day one of cycle or a new regimen? No Distress screening reviewed from previous visit, no further interventions    Toxicity Assessment:      Prechemo Checklist  Chemo Consent Signed: Yes  Protocol Verified: Yes  Is Protocol Standard of Care or Research?: Standard of Care  Pre-Chemo Labs Reviewed?: Yes  Provider Notified of Abnormal Labs Not Meeting Treatment Conditions: N/A  Pregnancy Screening Performed (if indicated): Not applicable  All Treatment Conditions Met?: Yes  BSA/weight in Orders Verified for Weight-Based Drugs?: Yes  Chemo Dose Calculations Verified: Yes     Pre-Treatment: Patient has valid pre-authorization  Premed orders, including hydration, are verified prior to administration  Chemotherapy doses independently doubled checked & verified by two practitioners  I have reviewed the following with the patient:  Name of chemo drug, duration and route of infusion, and reportable infusion-related symptoms.      Treatment: Refer to [facility] and MAR for line assessment and medication administration  Chemotherapy has not ; double checked & verified by two practitioners  Appearance and physical integrity of drugs meets standard of drug monograph; double checked & verified by two practitioners  Rate set on infusion pump is in alignment with ordered rate; double checked & verified by two practitioners  Blood return confirmed before, during and after treatment administered  Infusion pump used for non-vesicant drugs    Post Treatment: No Adverse Reaction Noted, Patient tolerated well.      Oral Chemotherapy: No    Education: No new instructions needed    Next appointment scheduled: 1 week chemo   Patient instructed to call the office with any questions or concerns.    Patient Discharged: patient discharged to home per self, ambulatory, with family member   Capillary refill less/equal to 2 seconds/Strong peripheral pulses

## 2022-05-03 ENCOUNTER — TRANSCRIPTION ENCOUNTER (OUTPATIENT)
Age: 72
End: 2022-05-03

## 2022-05-04 ENCOUNTER — APPOINTMENT (OUTPATIENT)
Dept: UROLOGY | Facility: CLINIC | Age: 72
End: 2022-05-04

## 2022-06-14 ENCOUNTER — NON-APPOINTMENT (OUTPATIENT)
Age: 72
End: 2022-06-14

## 2022-06-14 ENCOUNTER — APPOINTMENT (OUTPATIENT)
Dept: CARDIOLOGY | Facility: CLINIC | Age: 72
End: 2022-06-14
Payer: COMMERCIAL

## 2022-06-14 VITALS — OXYGEN SATURATION: 98 % | BODY MASS INDEX: 26.39 KG/M2 | HEART RATE: 91 BPM | WEIGHT: 200 LBS

## 2022-06-14 VITALS — DIASTOLIC BLOOD PRESSURE: 68 MMHG | SYSTOLIC BLOOD PRESSURE: 115 MMHG

## 2022-06-14 DIAGNOSIS — Z87.898 PERSONAL HISTORY OF OTHER SPECIFIED CONDITIONS: ICD-10-CM

## 2022-06-14 DIAGNOSIS — M79.602 PAIN IN RIGHT ARM: ICD-10-CM

## 2022-06-14 DIAGNOSIS — M79.601 PAIN IN RIGHT ARM: ICD-10-CM

## 2022-06-14 PROCEDURE — 93000 ELECTROCARDIOGRAM COMPLETE: CPT

## 2022-06-14 PROCEDURE — 99215 OFFICE O/P EST HI 40 MIN: CPT

## 2022-06-14 NOTE — CARDIOLOGY SUMMARY
[___] : [unfilled] [de-identified] : 3/9/2021: 10 minutes of the Rex protocol even repeat heart rate 158 beats minute with a normal blood pressure response.  No ST segment abnormalities.  Rare PVCs.  EF > 70%.  Mixed fixed defects in basal to mid inferior walls is corrected with prone imaging consistent with attenuation artifact.

## 2022-06-14 NOTE — HISTORY OF PRESENT ILLNESS
[FreeTextEntry1] : He presents in scheduled followup reporting that he continues to feel generally well. Remains active, including 30 minutes of treadmill exercise without any effort provoked symptoms.\par \par However 3 days ago, while driving home from the city the afternoon he experienced a sudden onset of bilateral shoulder pain radiating down both arms with associated arm weakness.  No palpitations, dyspnea or diaphoresis.  No focal neurologic symptoms.  He was able to continue driving and the episode resolved spontaneously within "a couple of minutes".  Felt well thereafter and his usual treadmill routine over the past 3 days has been well-tolerated.\par \par No c/o chest, throat,jaw, or upper back discomfort.  No dyspnea, orthopnea or PND.  No palpitations, dizziness or syncope.  No edema or claudication.\par \par Compliant with diabetic diet.  Morning blood sugars are almost always around 110-120.  Blood pressure is typically 110/70.

## 2022-06-21 ENCOUNTER — OUTPATIENT (OUTPATIENT)
Dept: OUTPATIENT SERVICES | Facility: HOSPITAL | Age: 72
LOS: 1 days | End: 2022-06-21
Payer: COMMERCIAL

## 2022-06-21 ENCOUNTER — APPOINTMENT (OUTPATIENT)
Dept: CT IMAGING | Facility: CLINIC | Age: 72
End: 2022-06-21
Payer: COMMERCIAL

## 2022-06-21 DIAGNOSIS — Z00.8 ENCOUNTER FOR OTHER GENERAL EXAMINATION: ICD-10-CM

## 2022-06-21 DIAGNOSIS — Z98.41 CATARACT EXTRACTION STATUS, RIGHT EYE: Chronic | ICD-10-CM

## 2022-06-21 DIAGNOSIS — Z87.898 PERSONAL HISTORY OF OTHER SPECIFIED CONDITIONS: ICD-10-CM

## 2022-06-21 PROCEDURE — 71275 CT ANGIOGRAPHY CHEST: CPT | Mod: 26

## 2022-06-21 PROCEDURE — 71275 CT ANGIOGRAPHY CHEST: CPT

## 2022-06-22 ENCOUNTER — NON-APPOINTMENT (OUTPATIENT)
Age: 72
End: 2022-06-22

## 2022-06-23 ENCOUNTER — APPOINTMENT (OUTPATIENT)
Dept: CARDIOLOGY | Facility: CLINIC | Age: 72
End: 2022-06-23

## 2022-06-23 PROCEDURE — 93306 TTE W/DOPPLER COMPLETE: CPT

## 2022-06-27 ENCOUNTER — APPOINTMENT (OUTPATIENT)
Dept: CARDIOLOGY | Facility: CLINIC | Age: 72
End: 2022-06-27
Payer: COMMERCIAL

## 2022-06-27 PROCEDURE — 93351 STRESS TTE COMPLETE: CPT

## 2022-07-25 ENCOUNTER — APPOINTMENT (OUTPATIENT)
Dept: CARDIOLOGY | Facility: CLINIC | Age: 72
End: 2022-07-25

## 2022-09-11 ENCOUNTER — NON-APPOINTMENT (OUTPATIENT)
Age: 72
End: 2022-09-11

## 2022-09-13 ENCOUNTER — APPOINTMENT (OUTPATIENT)
Dept: UROLOGY | Facility: CLINIC | Age: 72
End: 2022-09-13

## 2022-09-13 DIAGNOSIS — R35.0 FREQUENCY OF MICTURITION: ICD-10-CM

## 2022-09-13 PROCEDURE — 99214 OFFICE O/P EST MOD 30 MIN: CPT

## 2022-09-14 LAB
APPEARANCE: CLEAR
BACTERIA: NEGATIVE
BILIRUBIN URINE: NEGATIVE
BLOOD URINE: NEGATIVE
COLOR: YELLOW
GLUCOSE QUALITATIVE U: NORMAL
HYALINE CASTS: 1 /LPF
KETONES URINE: NEGATIVE
LEUKOCYTE ESTERASE URINE: NEGATIVE
MICROSCOPIC-UA: NORMAL
NITRITE URINE: NEGATIVE
PH URINE: 5.5
PROTEIN URINE: NEGATIVE
PSA FREE FLD-MCNC: 20 %
PSA FREE SERPL-MCNC: 1.78 NG/ML
PSA SERPL-MCNC: 8.82 NG/ML
RED BLOOD CELLS URINE: 1 /HPF
SPECIFIC GRAVITY URINE: 1.03
SQUAMOUS EPITHELIAL CELLS: 0 /HPF
UROBILINOGEN URINE: NORMAL
WHITE BLOOD CELLS URINE: 1 /HPF

## 2022-09-28 ENCOUNTER — NON-APPOINTMENT (OUTPATIENT)
Age: 72
End: 2022-09-28

## 2022-09-28 ENCOUNTER — APPOINTMENT (OUTPATIENT)
Dept: CARDIOLOGY | Facility: CLINIC | Age: 72
End: 2022-09-28

## 2022-09-28 VITALS
BODY MASS INDEX: 25.98 KG/M2 | DIASTOLIC BLOOD PRESSURE: 72 MMHG | SYSTOLIC BLOOD PRESSURE: 106 MMHG | HEART RATE: 98 BPM | OXYGEN SATURATION: 96 % | RESPIRATION RATE: 17 BRPM | WEIGHT: 196 LBS | HEIGHT: 73 IN

## 2022-09-28 PROCEDURE — 93000 ELECTROCARDIOGRAM COMPLETE: CPT

## 2022-09-28 PROCEDURE — 99214 OFFICE O/P EST MOD 30 MIN: CPT | Mod: 25

## 2022-09-28 RX ORDER — TIRZEPATIDE 5 MG/.5ML
5 INJECTION, SOLUTION SUBCUTANEOUS
Refills: 0 | Status: ACTIVE | COMMUNITY
Start: 2022-09-28

## 2022-09-28 RX ORDER — DULAGLUTIDE 1.5 MG/.5ML
1.5 INJECTION, SOLUTION SUBCUTANEOUS
Refills: 0 | Status: DISCONTINUED | COMMUNITY
Start: 2021-06-15 | End: 2022-09-28

## 2022-09-28 NOTE — HISTORY OF PRESENT ILLNESS
[FreeTextEntry1] : He presents in scheduled followup reporting that he continues to feel generally well. Remains active, including 30 minutes of treadmill exercise without any effort provoked symptoms.\par \par No recurrence of shoulder pain with arm weakness.  No palpitations, dyspnea or diaphoresis.  \par \par No c/o chest, throat,jaw, or upper back discomfort.  No dyspnea, orthopnea or PND.  No palpitations, dizziness or syncope.  No edema or claudication.\par \par Compliant with diabetic diet.

## 2022-09-28 NOTE — CARDIOLOGY SUMMARY
[de-identified] : 3/9/2021: 10 minutes of the Rex protocol even repeat heart rate 158 beats minute with a normal blood pressure response.  No ST segment abnormalities.  Rare PVCs.  EF > 70%.  Mixed fixed defects in basal to mid inferior walls is corrected with prone imaging consistent with attenuation artifact. [de-identified] : 6/23/2022 mild mitral vegetation.  Calcified trileaflet aortic valve without stenosis or regurgitation.  Ascending aorta = 3.7 cm.  Normal LV systolic function with 80 mm DUST".  Stage I diastolic dysfunction.  Estimated PA pressure = 22 mmHg. [de-identified] : 6/22/2022 CT: Normal aortic dimensions.  No intramural hematoma or dissection. [___] : [unfilled]

## 2023-01-06 ENCOUNTER — RX RENEWAL (OUTPATIENT)
Age: 73
End: 2023-01-06

## 2023-03-14 ENCOUNTER — APPOINTMENT (OUTPATIENT)
Dept: UROLOGY | Facility: CLINIC | Age: 73
End: 2023-03-14
Payer: COMMERCIAL

## 2023-03-14 VITALS
DIASTOLIC BLOOD PRESSURE: 77 MMHG | HEART RATE: 98 BPM | RESPIRATION RATE: 16 BRPM | OXYGEN SATURATION: 99 % | SYSTOLIC BLOOD PRESSURE: 129 MMHG | TEMPERATURE: 97.6 F

## 2023-03-14 DIAGNOSIS — R97.20 ELEVATED PROSTATE, SPECIFIC ANTIGEN [PSA]: ICD-10-CM

## 2023-03-14 PROCEDURE — 99214 OFFICE O/P EST MOD 30 MIN: CPT

## 2023-03-15 ENCOUNTER — TRANSCRIPTION ENCOUNTER (OUTPATIENT)
Age: 73
End: 2023-03-15

## 2023-03-15 LAB
APPEARANCE: CLEAR
BACTERIA: NEGATIVE
BILIRUBIN URINE: NEGATIVE
BLOOD URINE: NEGATIVE
COLOR: YELLOW
GLUCOSE QUALITATIVE U: ABNORMAL
HYALINE CASTS: 0 /LPF
KETONES URINE: NEGATIVE
LEUKOCYTE ESTERASE URINE: NEGATIVE
MICROSCOPIC-UA: NORMAL
NITRITE URINE: NEGATIVE
PH URINE: 5.5
PROTEIN URINE: NORMAL
PSA FREE FLD-MCNC: 24 %
PSA FREE SERPL-MCNC: 1.95 NG/ML
PSA SERPL-MCNC: 8.29 NG/ML
RED BLOOD CELLS URINE: 2 /HPF
SPECIFIC GRAVITY URINE: 1.03
SQUAMOUS EPITHELIAL CELLS: 0 /HPF
UROBILINOGEN URINE: NORMAL
WHITE BLOOD CELLS URINE: 2 /HPF

## 2023-03-18 ENCOUNTER — RX RENEWAL (OUTPATIENT)
Age: 73
End: 2023-03-18

## 2023-03-27 ENCOUNTER — NON-APPOINTMENT (OUTPATIENT)
Age: 73
End: 2023-03-27

## 2023-03-27 ENCOUNTER — APPOINTMENT (OUTPATIENT)
Dept: CARDIOLOGY | Facility: CLINIC | Age: 73
End: 2023-03-27
Payer: COMMERCIAL

## 2023-03-27 VITALS
DIASTOLIC BLOOD PRESSURE: 71 MMHG | HEART RATE: 91 BPM | BODY MASS INDEX: 25.18 KG/M2 | SYSTOLIC BLOOD PRESSURE: 107 MMHG | WEIGHT: 190 LBS | HEIGHT: 73 IN | OXYGEN SATURATION: 96 %

## 2023-03-27 PROCEDURE — 99214 OFFICE O/P EST MOD 30 MIN: CPT | Mod: 25

## 2023-03-27 PROCEDURE — 93000 ELECTROCARDIOGRAM COMPLETE: CPT

## 2023-03-27 NOTE — HISTORY OF PRESENT ILLNESS
[FreeTextEntry1] : He presents in scheduled followup reporting that he continues to feel very well. Remains active, including 30 minutes of treadmill exercise without any effort provoked symptoms.\par \par No recurrence of shoulder pain with arm weakness.  No palpitations, dyspnea or diaphoresis.  \par \par No c/o chest, throat,jaw, or upper back discomfort.  No dyspnea, orthopnea or PND.  No palpitations, dizziness or syncope.  No edema or claudication.\par \par Compliant with diabetic diet.\par \par Bruises easily.

## 2023-03-27 NOTE — CARDIOLOGY SUMMARY
[___] : [unfilled] [de-identified] : 3/9/2021: 10 minutes of the Rex protocol even repeat heart rate 158 beats minute with a normal blood pressure response.  No ST segment abnormalities.  Rare PVCs.  EF > 70%.  Mixed fixed defects in basal to mid inferior walls is corrected with prone imaging consistent with attenuation artifact. [de-identified] : 6/23/2022 mild mitral vegetation.  Calcified trileaflet aortic valve without stenosis or regurgitation.  Ascending aorta = 3.7 cm.  Normal LV systolic function with 80 mm DUST".  Stage I diastolic dysfunction.  Estimated PA pressure = 22 mmHg. [de-identified] : 6/22/2022 CT: Normal aortic dimensions.  No intramural hematoma or dissection.

## 2023-08-15 ENCOUNTER — RX RENEWAL (OUTPATIENT)
Age: 73
End: 2023-08-15

## 2023-08-17 ENCOUNTER — RX RENEWAL (OUTPATIENT)
Age: 73
End: 2023-08-17

## 2023-08-17 RX ORDER — EZETIMIBE 10 MG/1
10 TABLET ORAL
Qty: 90 | Refills: 3 | Status: ACTIVE | COMMUNITY
Start: 2018-03-12 | End: 1900-01-01

## 2023-09-21 ENCOUNTER — APPOINTMENT (OUTPATIENT)
Dept: UROLOGY | Facility: CLINIC | Age: 73
End: 2023-09-21
Payer: COMMERCIAL

## 2023-09-21 DIAGNOSIS — R97.20 ELEVATED PROSTATE, SPECIFIC ANTIGEN [PSA]: ICD-10-CM

## 2023-09-21 DIAGNOSIS — N52.9 MALE ERECTILE DYSFUNCTION, UNSPECIFIED: ICD-10-CM

## 2023-09-21 PROCEDURE — 99214 OFFICE O/P EST MOD 30 MIN: CPT

## 2023-09-21 RX ORDER — TADALAFIL 5 MG/1
5 TABLET ORAL
Qty: 90 | Refills: 3 | Status: ACTIVE | COMMUNITY
Start: 2023-09-21 | End: 1900-01-01

## 2023-09-22 LAB
APPEARANCE: CLEAR
BACTERIA: NEGATIVE /HPF
BILIRUBIN URINE: NEGATIVE
BLOOD URINE: NEGATIVE
CAST: 0 /LPF
COLOR: NORMAL
EPITHELIAL CELLS: 1 /HPF
GLUCOSE QUALITATIVE U: NEGATIVE MG/DL
KETONES URINE: ABNORMAL MG/DL
LEUKOCYTE ESTERASE URINE: NEGATIVE
MICROSCOPIC-UA: NORMAL
NITRITE URINE: NEGATIVE
PH URINE: 5.5
PROTEIN URINE: NEGATIVE MG/DL
PSA FREE FLD-MCNC: 20 %
PSA FREE SERPL-MCNC: 1.94 NG/ML
PSA SERPL-MCNC: 9.53 NG/ML
RED BLOOD CELLS URINE: 1 /HPF
SPECIFIC GRAVITY URINE: 1.03
UROBILINOGEN URINE: 0.2 MG/DL
WHITE BLOOD CELLS URINE: 1 /HPF

## 2023-09-22 RX ORDER — FINASTERIDE 5 MG/1
5 TABLET, FILM COATED ORAL
Qty: 90 | Refills: 3 | Status: ACTIVE | COMMUNITY
Start: 2023-09-22 | End: 1900-01-01

## 2023-09-26 ENCOUNTER — NON-APPOINTMENT (OUTPATIENT)
Age: 73
End: 2023-09-26

## 2023-09-26 ENCOUNTER — APPOINTMENT (OUTPATIENT)
Dept: CARDIOLOGY | Facility: CLINIC | Age: 73
End: 2023-09-26
Payer: COMMERCIAL

## 2023-09-26 VITALS
BODY MASS INDEX: 23.22 KG/M2 | HEART RATE: 98 BPM | WEIGHT: 176 LBS | SYSTOLIC BLOOD PRESSURE: 107 MMHG | OXYGEN SATURATION: 99 % | DIASTOLIC BLOOD PRESSURE: 70 MMHG

## 2023-09-26 PROCEDURE — 93000 ELECTROCARDIOGRAM COMPLETE: CPT

## 2023-09-26 PROCEDURE — 99214 OFFICE O/P EST MOD 30 MIN: CPT | Mod: 25

## 2023-09-26 RX ORDER — FINASTERIDE 5 MG/1
5 TABLET, FILM COATED ORAL
Refills: 0 | Status: ACTIVE | COMMUNITY
Start: 2023-09-26

## 2023-10-18 ENCOUNTER — RESULT REVIEW (OUTPATIENT)
Age: 73
End: 2023-10-18

## 2023-10-18 ENCOUNTER — OUTPATIENT (OUTPATIENT)
Dept: OUTPATIENT SERVICES | Facility: HOSPITAL | Age: 73
LOS: 1 days | End: 2023-10-18
Payer: COMMERCIAL

## 2023-10-18 ENCOUNTER — APPOINTMENT (OUTPATIENT)
Dept: MRI IMAGING | Facility: CLINIC | Age: 73
End: 2023-10-18
Payer: COMMERCIAL

## 2023-10-18 DIAGNOSIS — R97.20 ELEVATED PROSTATE SPECIFIC ANTIGEN [PSA]: ICD-10-CM

## 2023-10-18 DIAGNOSIS — Z00.8 ENCOUNTER FOR OTHER GENERAL EXAMINATION: ICD-10-CM

## 2023-10-18 DIAGNOSIS — Z98.41 CATARACT EXTRACTION STATUS, RIGHT EYE: Chronic | ICD-10-CM

## 2023-10-18 PROCEDURE — 76498 UNLISTED MR PROCEDURE: CPT

## 2023-10-18 PROCEDURE — 76498P: CUSTOM | Mod: 26

## 2023-10-18 PROCEDURE — 72197 MRI PELVIS W/O & W/DYE: CPT

## 2023-10-18 PROCEDURE — 72197 MRI PELVIS W/O & W/DYE: CPT | Mod: 26

## 2023-10-18 PROCEDURE — A9585: CPT

## 2024-03-20 ENCOUNTER — NON-APPOINTMENT (OUTPATIENT)
Age: 74
End: 2024-03-20

## 2024-03-21 ENCOUNTER — APPOINTMENT (OUTPATIENT)
Dept: UROLOGY | Facility: CLINIC | Age: 74
End: 2024-03-21
Payer: COMMERCIAL

## 2024-03-21 DIAGNOSIS — N40.1 BENIGN PROSTATIC HYPERPLASIA WITH LOWER URINARY TRACT SYMPMS: ICD-10-CM

## 2024-03-21 DIAGNOSIS — N13.8 BENIGN PROSTATIC HYPERPLASIA WITH LOWER URINARY TRACT SYMPMS: ICD-10-CM

## 2024-03-21 PROCEDURE — 99214 OFFICE O/P EST MOD 30 MIN: CPT

## 2024-03-21 PROCEDURE — G2211 COMPLEX E/M VISIT ADD ON: CPT

## 2024-03-21 NOTE — PHYSICAL EXAM
[Normal Appearance] : normal appearance [Well Groomed] : well groomed [General Appearance - In No Acute Distress] : no acute distress [Edema] : no peripheral edema [Exaggerated Use Of Accessory Muscles For Inspiration] : no accessory muscle use [Respiration, Rhythm And Depth] : normal respiratory rhythm and effort [Abdomen Soft] : soft [Abdomen Tenderness] : non-tender [Costovertebral Angle Tenderness] : no ~M costovertebral angle tenderness [Urinary Bladder Findings] : the bladder was normal on palpation [] : no rash [Normal Station and Gait] : the gait and station were normal for the patient's age [No Focal Deficits] : no focal deficits [Affect] : the affect was normal [Oriented To Time, Place, And Person] : oriented to person, place, and time [No Palpable Adenopathy] : no palpable adenopathy [Mood] : the mood was normal

## 2024-03-22 LAB
APPEARANCE: CLEAR
BACTERIA: NEGATIVE /HPF
BILIRUBIN URINE: NEGATIVE
BLOOD URINE: NEGATIVE
CAST: 0 /LPF
COLOR: YELLOW
EPITHELIAL CELLS: 0 /HPF
GLUCOSE QUALITATIVE U: NEGATIVE MG/DL
KETONES URINE: NEGATIVE MG/DL
LEUKOCYTE ESTERASE URINE: ABNORMAL
MICROSCOPIC-UA: NORMAL
NITRITE URINE: NEGATIVE
PH URINE: 6
PROTEIN URINE: NEGATIVE MG/DL
PSA FREE FLD-MCNC: 17 %
PSA FREE SERPL-MCNC: 0.85 NG/ML
PSA SERPL-MCNC: 4.95 NG/ML
RED BLOOD CELLS URINE: 1 /HPF
SPECIFIC GRAVITY URINE: 1.02
UROBILINOGEN URINE: 0.2 MG/DL
WHITE BLOOD CELLS URINE: 4 /HPF

## 2024-03-26 ENCOUNTER — NON-APPOINTMENT (OUTPATIENT)
Age: 74
End: 2024-03-26

## 2024-03-26 ENCOUNTER — APPOINTMENT (OUTPATIENT)
Dept: CARDIOLOGY | Facility: CLINIC | Age: 74
End: 2024-03-26
Payer: COMMERCIAL

## 2024-03-26 VITALS
HEART RATE: 96 BPM | SYSTOLIC BLOOD PRESSURE: 108 MMHG | DIASTOLIC BLOOD PRESSURE: 62 MMHG | HEIGHT: 73 IN | OXYGEN SATURATION: 97 % | BODY MASS INDEX: 23.19 KG/M2 | WEIGHT: 175 LBS

## 2024-03-26 DIAGNOSIS — E78.5 HYPERLIPIDEMIA, UNSPECIFIED: ICD-10-CM

## 2024-03-26 DIAGNOSIS — I25.10 ATHEROSCLEROTIC HEART DISEASE OF NATIVE CORONARY ARTERY W/OUT ANGINA PECTORIS: ICD-10-CM

## 2024-03-26 PROCEDURE — 93000 ELECTROCARDIOGRAM COMPLETE: CPT

## 2024-03-26 PROCEDURE — 99214 OFFICE O/P EST MOD 30 MIN: CPT | Mod: 25

## 2024-03-26 RX ORDER — TADALAFIL 20 MG/1
20 TABLET ORAL
Qty: 30 | Refills: 3 | Status: DISCONTINUED | COMMUNITY
Start: 2023-03-14 | End: 2024-03-26

## 2024-03-26 RX ORDER — METFORMIN HYDROCHLORIDE 500 MG/1
500 TABLET, COATED ORAL TWICE DAILY
Refills: 0 | Status: ACTIVE | COMMUNITY

## 2024-03-26 NOTE — CARDIOLOGY SUMMARY
[___] : [unfilled] [de-identified] : 3/9/2021: 10 minutes of the Rex protocol even repeat heart rate 158 beats minute with a normal blood pressure response.  No ST segment abnormalities.  Rare PVCs.  EF > 70%.  Mixed fixed defects in basal to mid inferior walls is corrected with prone imaging consistent with attenuation artifact. [de-identified] : 6/23/2022 mild mitral vegetation.  Calcified trileaflet aortic valve without stenosis or regurgitation.  Ascending aorta = 3.7 cm.  Normal LV systolic function with 80 mm DUST".  Stage I diastolic dysfunction.  Estimated PA pressure = 22 mmHg. [de-identified] : 6/22/2022 CT: Normal aortic dimensions.  No intramural hematoma or dissection.

## 2024-04-05 ENCOUNTER — NON-APPOINTMENT (OUTPATIENT)
Age: 74
End: 2024-04-05

## 2024-04-05 RX ORDER — TAMSULOSIN HYDROCHLORIDE 0.4 MG/1
0.4 CAPSULE ORAL
Qty: 90 | Refills: 3 | Status: ACTIVE | COMMUNITY
Start: 2024-04-05 | End: 1900-01-01

## 2024-05-18 ENCOUNTER — RX RENEWAL (OUTPATIENT)
Age: 74
End: 2024-05-18

## 2024-08-16 ENCOUNTER — RX RENEWAL (OUTPATIENT)
Age: 74
End: 2024-08-16

## 2024-09-24 ENCOUNTER — APPOINTMENT (OUTPATIENT)
Dept: CARDIOLOGY | Facility: CLINIC | Age: 74
End: 2024-09-24

## 2024-09-24 ENCOUNTER — APPOINTMENT (OUTPATIENT)
Dept: CARDIOLOGY | Facility: CLINIC | Age: 74
End: 2024-09-24
Payer: COMMERCIAL

## 2024-09-24 DIAGNOSIS — E78.5 HYPERLIPIDEMIA, UNSPECIFIED: ICD-10-CM

## 2024-09-24 DIAGNOSIS — I25.10 ATHEROSCLEROTIC HEART DISEASE OF NATIVE CORONARY ARTERY W/OUT ANGINA PECTORIS: ICD-10-CM

## 2024-09-24 PROCEDURE — 93351 STRESS TTE COMPLETE: CPT

## 2024-09-24 PROCEDURE — 99213 OFFICE O/P EST LOW 20 MIN: CPT

## 2024-09-24 NOTE — CARDIOLOGY SUMMARY
[___] : [unfilled] [de-identified] : 3/9/2021: 10 minutes of the Rex protocol even repeat heart rate 158 beats minute with a normal blood pressure response.  No ST segment abnormalities.  Rare PVCs.  EF > 70%.  Mixed fixed defects in basal to mid inferior walls is corrected with prone imaging consistent with attenuation artifact. [de-identified] : 6/23/2022 mild mitral vegetation.  Calcified trileaflet aortic valve without stenosis or regurgitation.  Ascending aorta = 3.7 cm.  Normal LV systolic function with 80 mm DUST".  Stage I diastolic dysfunction.  Estimated PA pressure = 22 mmHg. [de-identified] : 6/22/2022 CT: Normal aortic dimensions.  No intramural hematoma or dissection.

## 2024-09-24 NOTE — HISTORY OF PRESENT ILLNESS
[FreeTextEntry1] : He presents in scheduled followup including stress echocardiography.  He continues to feel very well. Remains active, including 30 minutes of treadmill exercise without any effort provoked symptoms.  This past month, 2 transient episodes of orthostatic lightheadedness.  No associated palpitations.  He notes that his resting pulse is often in the low 90s.     No c/o chest, throat,jaw, or upper back discomfort.  No dyspnea, orthopnea or PND.  No palpitations or syncope.  No palpitations, dyspnea or diaphoresis. No edema or claudication. Recent bouts of positional vertigo.  Carotid duplex and MRA were negative.  Symptoms are moderate in.

## 2024-09-25 ENCOUNTER — APPOINTMENT (OUTPATIENT)
Dept: UROLOGY | Facility: CLINIC | Age: 74
End: 2024-09-25
Payer: COMMERCIAL

## 2024-09-25 DIAGNOSIS — N40.1 BENIGN PROSTATIC HYPERPLASIA WITH LOWER URINARY TRACT SYMPMS: ICD-10-CM

## 2024-09-25 DIAGNOSIS — N13.8 BENIGN PROSTATIC HYPERPLASIA WITH LOWER URINARY TRACT SYMPMS: ICD-10-CM

## 2024-09-25 PROCEDURE — G2211 COMPLEX E/M VISIT ADD ON: CPT | Mod: NC

## 2024-09-25 PROCEDURE — 99214 OFFICE O/P EST MOD 30 MIN: CPT

## 2024-09-26 LAB
APPEARANCE: CLEAR
BACTERIA: NEGATIVE /HPF
BILIRUBIN URINE: NEGATIVE
BLOOD URINE: NEGATIVE
CAST: 0 /LPF
COLOR: YELLOW
EPITHELIAL CELLS: 0 /HPF
GLUCOSE QUALITATIVE U: NEGATIVE MG/DL
KETONES URINE: NEGATIVE MG/DL
LEUKOCYTE ESTERASE URINE: NEGATIVE
MICROSCOPIC-UA: NORMAL
NITRITE URINE: NEGATIVE
PH URINE: 5.5
PROTEIN URINE: NEGATIVE MG/DL
PSA FREE FLD-MCNC: 16 %
PSA FREE SERPL-MCNC: 0.71 NG/ML
PSA SERPL-MCNC: 4.39 NG/ML
RED BLOOD CELLS URINE: 0 /HPF
SPECIFIC GRAVITY URINE: 1.02
UROBILINOGEN URINE: 0.2 MG/DL
WHITE BLOOD CELLS URINE: 0 /HPF

## 2024-12-18 NOTE — ED ADULT TRIAGE NOTE - AS HEIGHT TYPE
Structural Heart and Valve Team     Patient: David Naqvi Date: 2024   : 1933 Attending: Jose Alberto Mei MD   89 year old male Room: /A     Chief Complaint: \"I'm ready to go home.\"  Post-op Day #: 1  Surgical Procedure: s/p successfully placed a 29 mm Evolut FX valve with Dr. Figueroa and Dr. Braga on 2024.    Subjective: Patient resting in bed. He denies dizziness, chest pain or shortness of breath. He is eating and drinking. He feels he is ready to go home.     Vital Last Value 24 Hour Range   Temperature 97.7 °F (36.5 °C) (24 0923) Temp  Min: 97.7 °F (36.5 °C)  Max: 98.5 °F (36.9 °C)   Pulse 64 (24 0747) Pulse  Min: 61  Max: 80   Respiratory Rate 20 (24 0747) Resp  Min: 16  Max: 20   Non-Invasive   Blood Pressure 127/63 (24 0747) BP  Min: 98/54  Max: 127/63   Arterial  Blood Pressure 111/70 (24 1645) No data recorded   Pulse Oximetry 96 % (24 0747) SpO2  Min: 93 %  Max: 97 %     Oxygen: RA    Weight over the past 48 Hours:   Patient Vitals for the past 48 hrs:   Weight   24 0500 69.9 kg (154 lb 1.6 oz)   24 0613 80 kg (176 lb 5.9 oz)      Admit Weight:   Weight: 74.7 kg (164 lb 10.9 oz) (12/10/24 173)  BMI:   BMI (Calculated): 22.33 (12/10/24 173)    Intake/Output:    Last Stool Occurrence: 1 (24)    Rhythm: Ventricular pacing; VVIR     Physical Exam:   Heart: Regular rate and rhythm, S1, S2 normal, no murmur, click, rub or gallop  Lungs: Clear to auscultation bilaterally  Abdomen: Soft, non-tender; bowel sounds normal; no masses, no hepatosplenomegaly  Skin: Skin color, texture, turgor normal. No rashes or lesions  Incision(s): Right groin soft, pressure dressing in place. Bilateral wrists are open to air.   Neurologic: Grossly normal and Alert and oriented to person, place and time  Extremities: no edema, redness or tenderness in the calves or thighs and pulses 2+. Scattered ecchymosis of BUE, R>L.     Laboratory  Results:    Recent Labs   Lab 12/18/24  0337 12/18/24  0336 12/17/24  1732 12/17/24  0628 12/16/24  1556 12/16/24  0641 12/15/24  0552   SODIUM  --   --   --  136  --  134* 134*   POTASSIUM  --   --   --  4.1 4.2 4.0 3.6   CHLORIDE  --   --   --  102  --  100 99   CO2  --   --   --  26  --  28 28   BUN  --   --   --  18  --  19 20   CREATININE  --   --   --  1.05  --  1.13 1.04   GLUCOSE  --   --   --  117*  --  96 99   WBC  --  6.9  --  10.0 8.9 4.6 5.2   HGB  --  10.3*  --  11.2* 11.0* 11.7* 11.8*   HCT  --  31.4*  --  32.9* 32.7* 34.4* 34.5*   PLT  --  127*  --  153 146 180 166   PT 14.6*  --   --  14.3*  --  14.6*  --    INR 1.4  --   --  1.4  --  1.4  --    PTT 44*  --  115* 32  --  53* 58*   BILIRUBIN  --   --   --   --   --  1.4*  --    ALKPT  --   --   --   --   --  142*  --    AST  --   --   --   --   --  26  --    GPT  --   --   --   --   --  23  --      Cultures: Reviewed Normal    Chest X-Ray: Reviewed  IMPRESSION:  1. Status post TAVR.  2. Mild pulmonary vascular congestion. Mild interstitial prominence could  represent superimposed mild interstitial edema.  3. Similar moderate left pleural effusion with associated atelectasis and  or consolidation within the left lower lung.    Echo 12/17/24 Reviewed  Final Impressions    * Limited TTE s/p TAVR on 12/16/24.    * s/p 29 mm Evolut FX valve. Mean gradient 8 mm Hg. No transvalvular  regurgitation. Trivial paravalvular regurgitation.    * Normal left ventricular size and systolic function, EF 58 %.    * Mildly enlarged right ventricle with normal systolic function.    * Mildly elevated right ventricular systolic pressure 40 mmHg.    * Small pericardial effusion, adjacent to the apex and LV. Left pleural  effusion identified.    Medications/Infusions:  Scheduled:    WARFARIN - PHARMACIST MONITORED Misc Does not apply See Admin Instructions    furosemide (LASIX) tablet 40 mg Oral Daily    sodium chloride 0.9 % injection 2 mL Intracatheter 2 times per day     Potassium Standard Replacement Protocol (Levels 3.5 and lower) Does not apply See Admin Instructions    Potassium Replacement (Levels 3.6 - 4) Does not apply See Admin Instructions    Magnesium Standard Replacement Protocol Does not apply See Admin Instructions    Phosphorus Standard Replacement Protocol Does not apply See Admin Instructions    aspirin chewable 81 mg Oral Daily    levothyroxine (SYNTHROID, LEVOTHROID) tablet 88 mcg Oral Daily    metoPROLOL succinate (TOPROL-XL) ER tablet 12.5 mg Oral Daily       Continuous Infusions:   Current Facility-Administered Medications   Medication Dose Route Frequency Provider Last Rate Last Admin    heparin (porcine) 25,000 units/250 mL in dextrose 5 % infusion  1-30 Units/kg/hr (Dosing Weight) Intravenous Continuous Tres Ruiz DO 7.5 mL/hr at 12/18/24 0501 10 Units/kg/hr at 12/18/24 0501       CMS Criteria:  ASA: Yes    BB: Yes    Statin:Yes    ACE: Not Applicable    Cardiologist: Anthony   EF: 65%  Hemoglobin A1c:   Hemoglobin A1C (%)   Date Value   02/13/2024 5.5     Preoperative Creatinine: 1.13    Assessment/Plan:  Severe-Critical Aortic Valve Stenosis s/p TAVR   - Echo today   - Currently on Heparin gtt. Resume PTA Warfarin. INR 1.4. Start bASA.  - Right groin oozing yesterday. Pressure dressing in place today. Surrounding area feels soft and without hematoma.    Acute on Chronic HFpEF, present on admission: Weight down since admission. Appears well compensated on exam. Chest X-ray 12/17 with mild pulmonary congestion and pleural effusion. Resume PTA Lasix.   Atrial Fibrillation, Chronic: Rhythm V-paced. Continue Heparin gtt. Resume PTA Warfarin.   Hypertension: +PTA BB. PRN Hydralazine for SBP >160.  SSS s/p Ablation, s/p PPM  Small Pericardial Effusion: Discussed with Dr. Braga. If DC today, then no Lovenox Bridge and repeat Echo outpatient in 1 week. If remains in the hospital, obtain limited Echo tomorrow 12/19.   Chronic Pleural Effusions:  Chest-xray today-reviewed. Pulmonary following.   CKD III: Creat 1.05, monitor closely.   Anemia: H/H mildly lower today. Groin appears soft without bleeding or hematoma.   Thrombocytopenia: Platelets: 127. Slight drop since procedure.   Deconditioning: PT/OT/CR     Pathways:  Ambulating: Yes  Coumadin: Yes, for Atrial Fibrillation    Discussed with or notes reviewed:  Patient, RN, and MD    Discharge Disposition: OK to discharge from Valve Team standpoint. AVS updated. Follow up appointment made.    stated

## 2025-02-11 ENCOUNTER — RX RENEWAL (OUTPATIENT)
Age: 75
End: 2025-02-11

## 2025-03-26 ENCOUNTER — APPOINTMENT (OUTPATIENT)
Dept: UROLOGY | Facility: CLINIC | Age: 75
End: 2025-03-26
Payer: COMMERCIAL

## 2025-03-26 DIAGNOSIS — N40.1 BENIGN PROSTATIC HYPERPLASIA WITH LOWER URINARY TRACT SYMPMS: ICD-10-CM

## 2025-03-26 DIAGNOSIS — N13.8 BENIGN PROSTATIC HYPERPLASIA WITH LOWER URINARY TRACT SYMPMS: ICD-10-CM

## 2025-03-26 PROCEDURE — 99214 OFFICE O/P EST MOD 30 MIN: CPT

## 2025-03-27 LAB
APPEARANCE: CLEAR
BACTERIA: NEGATIVE /HPF
BILIRUBIN URINE: NEGATIVE
BLOOD URINE: NEGATIVE
CAST: 0 /LPF
COLOR: YELLOW
EPITHELIAL CELLS: 0 /HPF
GLUCOSE QUALITATIVE U: NEGATIVE MG/DL
KETONES URINE: NEGATIVE MG/DL
LEUKOCYTE ESTERASE URINE: NEGATIVE
MICROSCOPIC-UA: NORMAL
NITRITE URINE: NEGATIVE
PH URINE: 5.5
PROTEIN URINE: NEGATIVE MG/DL
PSA FREE FLD-MCNC: 17 %
PSA FREE SERPL-MCNC: 0.76 NG/ML
PSA SERPL-MCNC: 4.37 NG/ML
RED BLOOD CELLS URINE: 1 /HPF
SPECIFIC GRAVITY URINE: 1.02
UROBILINOGEN URINE: 0.2 MG/DL
WHITE BLOOD CELLS URINE: 0 /HPF

## 2025-03-28 LAB — URINE CYTOLOGY: NORMAL

## 2025-05-26 ENCOUNTER — RX RENEWAL (OUTPATIENT)
Age: 75
End: 2025-05-26

## 2025-08-16 ENCOUNTER — RX RENEWAL (OUTPATIENT)
Age: 75
End: 2025-08-16

## 2025-09-08 ENCOUNTER — RX RENEWAL (OUTPATIENT)
Age: 75
End: 2025-09-08